# Patient Record
Sex: MALE | Race: BLACK OR AFRICAN AMERICAN | NOT HISPANIC OR LATINO | Employment: FULL TIME | URBAN - METROPOLITAN AREA
[De-identification: names, ages, dates, MRNs, and addresses within clinical notes are randomized per-mention and may not be internally consistent; named-entity substitution may affect disease eponyms.]

---

## 2017-01-24 RX ORDER — IBUPROFEN 800 MG/1
TABLET ORAL
Qty: 30 TAB | Refills: 0 | Status: SHIPPED | OUTPATIENT
Start: 2017-01-24 | End: 2022-01-11

## 2017-01-24 NOTE — TELEPHONE ENCOUNTER
Was the patient seen in the last year in this department? Yes 02/02/16 Jordan    Does patient have an active prescription for medications requested? No     Received Request Via: Pharmacy

## 2017-07-10 ENCOUNTER — TELEPHONE (OUTPATIENT)
Dept: MEDICAL GROUP | Facility: MEDICAL CENTER | Age: 47
End: 2017-07-10

## 2017-07-10 RX ORDER — IBUPROFEN 800 MG/1
TABLET ORAL
Qty: 60 TAB | OUTPATIENT
Start: 2017-07-10

## 2017-07-10 NOTE — TELEPHONE ENCOUNTER
PT called in about the refill for ibuprofen, he said he has switched providers. I let PT that he needs to contact new provider to refill.

## 2018-02-14 ENCOUNTER — HOSPITAL ENCOUNTER (EMERGENCY)
Facility: MEDICAL CENTER | Age: 48
End: 2018-02-14
Attending: EMERGENCY MEDICINE
Payer: MEDICARE

## 2018-02-14 VITALS
TEMPERATURE: 97 F | BODY MASS INDEX: 26.95 KG/M2 | WEIGHT: 188.27 LBS | SYSTOLIC BLOOD PRESSURE: 146 MMHG | HEART RATE: 103 BPM | DIASTOLIC BLOOD PRESSURE: 92 MMHG | RESPIRATION RATE: 16 BRPM | HEIGHT: 70 IN | OXYGEN SATURATION: 95 %

## 2018-02-14 PROCEDURE — 302449 STATCHG TRIAGE ONLY (STATISTIC)

## 2018-02-14 ASSESSMENT — PAIN SCALES - GENERAL: PAINLEVEL_OUTOF10: 7

## 2018-02-15 ENCOUNTER — HOSPITAL ENCOUNTER (EMERGENCY)
Facility: MEDICAL CENTER | Age: 48
End: 2018-02-15
Attending: EMERGENCY MEDICINE
Payer: MEDICARE

## 2018-02-15 VITALS
HEART RATE: 82 BPM | DIASTOLIC BLOOD PRESSURE: 67 MMHG | BODY MASS INDEX: 25.59 KG/M2 | TEMPERATURE: 97.9 F | SYSTOLIC BLOOD PRESSURE: 138 MMHG | RESPIRATION RATE: 20 BRPM | WEIGHT: 182.76 LBS | OXYGEN SATURATION: 97 % | HEIGHT: 71 IN

## 2018-02-15 DIAGNOSIS — K08.89 PAIN, DENTAL: ICD-10-CM

## 2018-02-15 PROCEDURE — 99283 EMERGENCY DEPT VISIT LOW MDM: CPT

## 2018-02-15 RX ORDER — OXYCODONE HYDROCHLORIDE 5 MG/1
5 TABLET ORAL EVERY 6 HOURS PRN
Qty: 8 TAB | Refills: 0 | Status: SHIPPED | OUTPATIENT
Start: 2018-02-15 | End: 2018-02-17

## 2018-02-15 RX ORDER — PENICILLIN V POTASSIUM 500 MG/1
500 TABLET ORAL 3 TIMES DAILY
Qty: 21 TAB | Refills: 0 | Status: SHIPPED | OUTPATIENT
Start: 2018-02-15 | End: 2018-02-22

## 2018-02-15 ASSESSMENT — LIFESTYLE VARIABLES: DO YOU DRINK ALCOHOL: NO

## 2018-02-16 NOTE — ED NOTES
PT assessment complete. Agree with triage note. Pt c/o right lower jaw pain for 1 week. Pt states that he had dental work and now has worsening pain. PT in gown. Educated on NPO status until cleared by MD. Pt is alert, active, age appropriate, and NAD. No needs. Will continue to monitor.

## 2018-02-16 NOTE — ED PROVIDER NOTES
ED Provider Note    Scribed for Dalton Rebollar M.D. by Moriah Metz. 2/15/2018  4:55 PM    Primary care provider: Pcp Pt States None  Means of arrival: Walk-in  History obtained from: Patient  History limited by: None    CHIEF COMPLAINT  Chief Complaint   Patient presents with   • Dental Pain     Right lower.  Pt reports he had dental work on that tooth 4 months ago.  No swelling noted.        HPI  Ricky Catalan is a 47 y.o. male who presents to the Emergency Department for evaluation of right lower dental pain, onset one week ago. Patient reports that the pain radiates to his jaw.       REVIEW OF SYSTEMS  Pertinent positives include dental pain, jaw pain.  See HPI for further details. E.     PAST MEDICAL HISTORY   has a past medical history of Brugada syndrome; FH: Brugada syndrome; and Psychiatric disorder.    SURGICAL HISTORY   has a past surgical history that includes bunionectomy (5/03/2016).    SOCIAL HISTORY  Social History   Substance Use Topics   • Smoking status: Former Smoker     Packs/day: 1.00     Years: 0.50     Types: Cigarettes     Quit date: 5/15/2006   • Smokeless tobacco: Not on file   • Alcohol use No      History   Drug Use No       FAMILY HISTORY  Family History   Problem Relation Age of Onset   • Hypertension Father    • Cancer Father    • Heart Disease Mother    • Psychiatry Mother        CURRENT MEDICATIONS  No current facility-administered medications for this encounter.     Current Outpatient Prescriptions:   •  ibuprofen (MOTRIN) 800 MG Tab, TAKE 1 TABLET EVERY 8 HOURS AS NEEDED  FOR  MILD  PAIN (SUBSTITUTED FOR MOTRIN), Disp: 30 Tab, Rfl: 0  •  pantoprazole (PROTONIX) 40 MG Tablet Delayed Response, Take 1 Tab by mouth every day., Disp: 30 Tab, Rfl: 11  •  etodolac (LODINE) 300 MG Cap, Take 1 Cap by mouth 2 times a day., Disp: 60 Cap, Rfl: 3  •  ketoconazole (NIZORAL) 2 % Cream, Apply 1 Application to affected area(s) 2 times a day. APPLY ONE APPLICATION TO AFFECTED AREA TWICE  "DAILY FOR 20 DAYS, Disp: 60 g, Rfl: 1  •  baclofen (LIORESAL) 10 MG Tab, Take 1 Tab by mouth 3 times a day., Disp: 60 Tab, Rfl: 6  •  venlafaxine XR (EFFEXOR XR) 75 MG CAPSULE SR 24 HR, Take 1 Cap by mouth every day., Disp: 30 Cap, Rfl: 3  •  sumatriptan (IMITREX) 50 MG Tab, Take 1 Tab by mouth Once PRN for Migraine for up to 1 dose., Disp: 10 Tab, Rfl: 3  •  zolpidem (AMBIEN) 5 MG Tab, Take 1 Tab by mouth at bedtime as needed for Sleep., Disp: 30 Tab, Rfl: 0  •  risperidone (RISPERDAL) 0.25 MG TABS, Take 0.25 mg by mouth 2 times a day., Disp: , Rfl:   •  quetiapine (SEROQUEL) 100 MG TABS, TAKE 1 TABLET BY MOUTH TWICE DAILY, Disp: 60 Tab, Rfl: 0    ALLERGIES  No Known Allergies    PHYSICAL EXAM  VITAL SIGNS: /107   Pulse 75   Temp 36.2 °C (97.1 °F)   Resp 16   Ht 1.791 m (5' 10.5\")   Wt 82.9 kg (182 lb 12.2 oz)   SpO2 98%   BMI 25.85 kg/m²     Constitutional: Well developed, Well nourished,no acute distress, Non-toxic appearance.   HENT: Tenderness to right side jaw. Normocephalic, Atraumatic. Oropharynx moist.   Eyes: PERRL, EOMI, Conjunctiva normal, No discharge.   Skin: Warm, Dry, No erythema, No rash.    Musculoskeletal: No major deformities noted.   Neurologic: Awake, alert. Moves all extremities spontaneously.  Psychiatric: Affect normal, Mood normal.       COURSE & MEDICAL DECISION MAKING  Nursing notes, VS, PMSFHx reviewed in chart.    4:55 PM - Patient seen and examined at bedside. Encouraged dental follow up. The patient will be discharged with Oxycodone and Veetid at this time and should return if symptoms worsen or if new symptoms arise. The patient understands and agrees to plan.     The patient is referred to a primary physician for blood pressure management, diabetic screening, and for all other preventative health concerns.    In prescribing controlled substances to this patient, I certify that I have obtained and reviewed the medical history of Ricky Catalan. I have also made a " good martita effort to obtain applicable records from other providers who have treated the patient and no recent narcotic prescriptions.     I have conducted a physical exam and documented it. I have reviewed Mr. Catalan’s prescription history as maintained by the [place] Prescription Monitoring Program.     I have assessed the patient’s risk for abuse, dependency, and addiction using the validated Opioid Risk Tool available at https://www.mdcalc.com/zfyyed-btqs-bhks-ort-narcotic-abuse.     Given the above, I believe the benefits of controlled substance therapy outweigh the risks. The reasons for prescribing controlled substances include in my professional opinion, controlled substances are the only reasonable choice for this patient because his dental pain cannot be fully resolved here.. Accordingly, I have discussed the risk and benefits, treatment plan, and alternative therapies with the patient. Patient does seem uncomfortable I'll prescribe only a small amount of narcotics until the patient can follow-up with dentist      DISPOSITION:  Patient will be discharged home in stable condition.    FOLLOW UP:  No follow-up provider specified.    OUTPATIENT MEDICATIONS:  New Prescriptions    OXYCODONE IMMEDIATE-RELEASE (ROXICODONE) 5 MG TAB    Take 1 Tab by mouth every 6 hours as needed for Severe Pain for up to 2 days.    PENICILLIN V POTASSIUM (VEETID) 500 MG TAB    Take 1 Tab by mouth 3 times a day for 7 days.       FINAL IMPRESSION  1. Pain, dental          I, Moriah Metz (Hedy), am scribing for, and in the presence of, Dalton Rebollar M.D..  r  Electronically signed by: Moriah Garza), 2/15/2018    IDalton M.D. personally performed the services described in this documentation, as scribed by Moriah Metz in my presence, and it is both accurate and complete.    The note accurately reflects work and decisions made by me.  Dalton Rebollar  2/15/2018  6:37 PM

## 2018-02-16 NOTE — ED TRIAGE NOTES
Chief Complaint   Patient presents with   • Dental Pain     Right lower.  Pt reports he had dental work on that tooth 4 months ago.  No swelling noted.    Pt to triage in NAD.  Pt educated on triage process and instructed to notify triage RN of any change in status.

## 2018-03-10 ENCOUNTER — HOSPITAL ENCOUNTER (EMERGENCY)
Facility: MEDICAL CENTER | Age: 48
End: 2018-03-10
Attending: EMERGENCY MEDICINE
Payer: MEDICARE

## 2018-03-10 VITALS
HEIGHT: 71 IN | OXYGEN SATURATION: 97 % | BODY MASS INDEX: 25.62 KG/M2 | DIASTOLIC BLOOD PRESSURE: 106 MMHG | WEIGHT: 183 LBS | HEART RATE: 78 BPM | RESPIRATION RATE: 18 BRPM | TEMPERATURE: 97.8 F | SYSTOLIC BLOOD PRESSURE: 149 MMHG

## 2018-03-10 DIAGNOSIS — K08.89 TOOTHACHE: ICD-10-CM

## 2018-03-10 PROCEDURE — 99284 EMERGENCY DEPT VISIT MOD MDM: CPT

## 2018-03-10 PROCEDURE — A9270 NON-COVERED ITEM OR SERVICE: HCPCS | Performed by: EMERGENCY MEDICINE

## 2018-03-10 PROCEDURE — 700102 HCHG RX REV CODE 250 W/ 637 OVERRIDE(OP): Performed by: EMERGENCY MEDICINE

## 2018-03-10 RX ORDER — AMOXICILLIN 500 MG/1
500 CAPSULE ORAL 3 TIMES DAILY
Qty: 21 CAP | Refills: 0 | Status: SHIPPED | OUTPATIENT
Start: 2018-03-10 | End: 2018-03-10

## 2018-03-10 RX ORDER — AMOXICILLIN 500 MG/1
500 CAPSULE ORAL 3 TIMES DAILY
Qty: 21 CAP | Refills: 0 | Status: SHIPPED | OUTPATIENT
Start: 2018-03-10 | End: 2018-03-17

## 2018-03-10 RX ORDER — AMOXICILLIN 500 MG/1
500 CAPSULE ORAL ONCE
Status: COMPLETED | OUTPATIENT
Start: 2018-03-10 | End: 2018-03-10

## 2018-03-10 RX ADMIN — AMOXICILLIN 500 MG: 500 CAPSULE ORAL at 17:43

## 2018-03-10 ASSESSMENT — PAIN SCALES - GENERAL: PAINLEVEL_OUTOF10: 7

## 2018-03-11 NOTE — ED NOTES
Reviewed d/c instructions with pt. Paper prescription provided per request. Has taken amoxicillin before without a reaction. Left in NAD with steady gait. A&OX4.

## 2018-03-11 NOTE — ED PROVIDER NOTES
ED Provider Note    Scribed for Lio Johnson M.D. by Namrata Rey. 3/10/2018  5:10 PM    Primary care provider: Pcp Pt States None  Means of arrival: Walk-in  History obtained from: Patient   History limited by: none    CHIEF COMPLAINT  Chief Complaint   Patient presents with   • Dental Pain       HPI  Ricky Catalan is a 47 y.o. male who presents to the Emergency Department for lower right dental pain with associated right jaw swelling. Patient denies any fever or loss of sensation to affected area associated. He has taken Ibuprofen with no relief of his pain. He has history of tooth infection. Patient denies allergies to medications.     REVIEW OF SYSTEMS  Pertinent positives include right lower dental pain, right jaw swelling. Pertinent negatives include no fever, loss of sensation to affected area.    E.    PAST MEDICAL HISTORY   has a past medical history of Brugada syndrome; FH: Brugada syndrome; and Psychiatric disorder.    SURGICAL HISTORY   has a past surgical history that includes bunionectomy (5/03/2016).    SOCIAL HISTORY  Social History   Substance Use Topics   • Smoking status: Former Smoker     Packs/day: 1.00     Years: 0.50     Types: Cigarettes     Quit date: 5/15/2006   • Alcohol use No      History   Drug Use No       FAMILY HISTORY  Family History   Problem Relation Age of Onset   • Hypertension Father    • Cancer Father    • Heart Disease Mother    • Psychiatry Mother        CURRENT MEDICATIONS  No current facility-administered medications on file prior to encounter.      Current Outpatient Prescriptions on File Prior to Encounter   Medication Sig Dispense Refill   • ibuprofen (MOTRIN) 800 MG Tab TAKE 1 TABLET EVERY 8 HOURS AS NEEDED  FOR  MILD  PAIN (SUBSTITUTED FOR MOTRIN) 30 Tab 0   • pantoprazole (PROTONIX) 40 MG Tablet Delayed Response Take 1 Tab by mouth every day. 30 Tab 11   • etodolac (LODINE) 300 MG Cap Take 1 Cap by mouth 2 times a day. 60 Cap 3   • ketoconazole  "(NIZORAL) 2 % Cream Apply 1 Application to affected area(s) 2 times a day. APPLY ONE APPLICATION TO AFFECTED AREA TWICE DAILY FOR 20 DAYS 60 g 1   • baclofen (LIORESAL) 10 MG Tab Take 1 Tab by mouth 3 times a day. 60 Tab 6   • venlafaxine XR (EFFEXOR XR) 75 MG CAPSULE SR 24 HR Take 1 Cap by mouth every day. 30 Cap 3   • sumatriptan (IMITREX) 50 MG Tab Take 1 Tab by mouth Once PRN for Migraine for up to 1 dose. 10 Tab 3   • zolpidem (AMBIEN) 5 MG Tab Take 1 Tab by mouth at bedtime as needed for Sleep. 30 Tab 0   • risperidone (RISPERDAL) 0.25 MG TABS Take 0.25 mg by mouth 2 times a day.     • quetiapine (SEROQUEL) 100 MG TABS TAKE 1 TABLET BY MOUTH TWICE DAILY 60 Tab 0         ALLERGIES  No Known Allergies    PHYSICAL EXAM  VITAL SIGNS: /89   Pulse 72   Temp 36.6 °C (97.8 °F) (Temporal)   Resp 14   Ht 1.803 m (5' 11\")   Wt 83 kg (183 lb)   SpO2 97%   BMI 25.52 kg/m²     Constitutional: Well developed, Well nourished, no distress, Non-toxic appearance.   HENT: Normocephalic, Atraumatic.  Oropharynx moist. Right lower posterior molar tenderness to palpation, no swelling, no abscess.   Eyes: PERRL, EOMI, Conjunctiva normal, No discharge.   Neck: no anterior cervical lymphadenopathy  CV: Good pulses  Thorax & Lungs: No respiratory distress.   Skin: Warm, Dry, No erythema, No rash.    Musculoskeletal: No major deformities noted.   Neurologic: Awake, alert. Moves all extremities spontaneously.  Psychiatric: Affect normal, Mood normal.     COURSE & MEDICAL DECISION MAKING  Pertinent Labs & Imaging studies reviewed. (See chart for details)        5:10 PM - Patient seen and examined at bedside. Discussed with patient that I will treat with 500mg Amoxicillin and that I cannot prescribe narcotics for his pain modulation. Advised patient treat with Ibuprofen and Tylenol. Patient was counseled to return to ED for any new or worsening symptoms. Patient understood and is in agreement for discharge.       Decision " Making:  Toothache we'll put the patient on amoxicillin, patient asked for narcotic pain medicines, this was denied.    The patient is referred to a primary physician for blood pressure management, diabetic screening, and for all other preventative health concerns.    DISPOSITION:  Patient will be discharged home in stable condition.    FOLLOW UP:  [facility], Emergency Dept  [address] [facility] [id number] [phone]    If symptoms worsen      OUTPATIENT MEDICATIONS:  Discharge Medication List as of 3/10/2018  5:38 PM      START taking these medications    Details   amoxicillin (AMOXIL) 500 MG Cap Take 1 Cap by mouth 3 times a day for 7 days., Disp-21 Cap, R-0, Normal               FINAL IMPRESSION  1. Toothache          INamrata (Scribe), am scribing for, and in the presence of, Lio Johnson M.D..    Electronically signed by: Namrata Rey (Scribe), 3/10/2018    ILio M.D. personally performed the services described in this documentation, as scribed by Namrata Rey in my presence, and it is both accurate and complete.    The note accurately reflects work and decisions made by me.  Lio Johnson  3/10/2018  7:17 PM

## 2018-03-11 NOTE — DISCHARGE INSTRUCTIONS
Please follow-up with your primary care provider for blood pressure management.    Toothache  Toothaches are usually caused by tooth decay (cavity). However, other causes of toothache include:  · Gum disease.  · Cracked tooth.  · Cracked filling.  · Injury.  · Jaw problem (temporo mandibular joint or TMJ disorder).  · Tooth abscess.  · Root sensitivity.  · Grinding.  · Eruption problems.  Swelling and redness around a painful tooth often means you have a dental abscess.  Pain medicine and antibiotics can help reduce symptoms, but you will need to see a dentist within the next few days to have your problem properly evaluated and treated. If tooth decay is the problem, you may need a filling or root canal to save your tooth. If the problem is more severe, your tooth may need to be pulled.  SEEK IMMEDIATE MEDICAL CARE IF:  · You cannot swallow.  · You develop severe swelling, increased redness, or increased pain in your mouth or face.  · You have a fever.  · You cannot open your mouth adequately.  Document Released: 01/25/2006 Document Revised: 03/11/2013 Document Reviewed: 03/17/2011  [employer]® Patient Information ©2014 [employer].

## 2018-12-11 ENCOUNTER — HOSPITAL ENCOUNTER (OUTPATIENT)
Dept: LAB | Facility: MEDICAL CENTER | Age: 48
End: 2018-12-11
Attending: NURSE PRACTITIONER
Payer: COMMERCIAL

## 2018-12-11 ENCOUNTER — OFFICE VISIT (OUTPATIENT)
Dept: BEHAVIORAL HEALTH | Facility: CLINIC | Age: 48
End: 2018-12-11
Payer: MEDICARE

## 2018-12-11 VITALS
SYSTOLIC BLOOD PRESSURE: 125 MMHG | HEART RATE: 80 BPM | DIASTOLIC BLOOD PRESSURE: 80 MMHG | HEIGHT: 70 IN | WEIGHT: 167 LBS | BODY MASS INDEX: 23.91 KG/M2

## 2018-12-11 DIAGNOSIS — F31.81 BIPOLAR 2 DISORDER (HCC): ICD-10-CM

## 2018-12-11 DIAGNOSIS — Z79.899 OTHER LONG TERM (CURRENT) DRUG THERAPY: ICD-10-CM

## 2018-12-11 DIAGNOSIS — F15.20 METHAMPHETAMINE DEPENDENCE, CONTINUOUS (HCC): ICD-10-CM

## 2018-12-11 DIAGNOSIS — Z95.0 PACEMAKER: ICD-10-CM

## 2018-12-11 DIAGNOSIS — F19.94 MOOD DISORDER, DRUG-INDUCED (HCC): ICD-10-CM

## 2018-12-11 PROBLEM — F32.9 MAJOR DEPRESSIVE DISORDER: Status: ACTIVE | Noted: 2018-12-11

## 2018-12-11 LAB
ALBUMIN SERPL BCP-MCNC: 4.6 G/DL (ref 3.2–4.9)
ALBUMIN/GLOB SERPL: 1.6 G/DL
ALP SERPL-CCNC: 63 U/L (ref 30–99)
ALT SERPL-CCNC: 15 U/L (ref 2–50)
ANION GAP SERPL CALC-SCNC: 8 MMOL/L (ref 0–11.9)
AST SERPL-CCNC: 20 U/L (ref 12–45)
BASOPHILS # BLD AUTO: 0.5 % (ref 0–1.8)
BASOPHILS # BLD: 0.03 K/UL (ref 0–0.12)
BILIRUB SERPL-MCNC: 0.7 MG/DL (ref 0.1–1.5)
BUN SERPL-MCNC: 12 MG/DL (ref 8–22)
CALCIUM SERPL-MCNC: 9.3 MG/DL (ref 8.5–10.5)
CHLORIDE SERPL-SCNC: 105 MMOL/L (ref 96–112)
CHOLEST SERPL-MCNC: 208 MG/DL (ref 100–199)
CO2 SERPL-SCNC: 27 MMOL/L (ref 20–33)
CREAT SERPL-MCNC: 0.93 MG/DL (ref 0.5–1.4)
EOSINOPHIL # BLD AUTO: 0.02 K/UL (ref 0–0.51)
EOSINOPHIL NFR BLD: 0.3 % (ref 0–6.9)
ERYTHROCYTE [DISTWIDTH] IN BLOOD BY AUTOMATED COUNT: 49.1 FL (ref 35.9–50)
FASTING STATUS PATIENT QL REPORTED: NORMAL
GLOBULIN SER CALC-MCNC: 2.8 G/DL (ref 1.9–3.5)
GLUCOSE SERPL-MCNC: 83 MG/DL (ref 65–99)
HCT VFR BLD AUTO: 46.1 % (ref 42–52)
HDLC SERPL-MCNC: 96 MG/DL
HGB BLD-MCNC: 15.2 G/DL (ref 14–18)
IMM GRANULOCYTES # BLD AUTO: 0.01 K/UL (ref 0–0.11)
IMM GRANULOCYTES NFR BLD AUTO: 0.2 % (ref 0–0.9)
LDLC SERPL CALC-MCNC: 100 MG/DL
LYMPHOCYTES # BLD AUTO: 2.02 K/UL (ref 1–4.8)
LYMPHOCYTES NFR BLD: 33.9 % (ref 22–41)
MCH RBC QN AUTO: 27.8 PG (ref 27–33)
MCHC RBC AUTO-ENTMCNC: 33 G/DL (ref 33.7–35.3)
MCV RBC AUTO: 84.3 FL (ref 81.4–97.8)
MONOCYTES # BLD AUTO: 0.35 K/UL (ref 0–0.85)
MONOCYTES NFR BLD AUTO: 5.9 % (ref 0–13.4)
NEUTROPHILS # BLD AUTO: 3.53 K/UL (ref 1.82–7.42)
NEUTROPHILS NFR BLD: 59.2 % (ref 44–72)
NRBC # BLD AUTO: 0 K/UL
NRBC BLD-RTO: 0 /100 WBC
PLATELET # BLD AUTO: 225 K/UL (ref 164–446)
PMV BLD AUTO: 11.4 FL (ref 9–12.9)
POTASSIUM SERPL-SCNC: 4 MMOL/L (ref 3.6–5.5)
PROT SERPL-MCNC: 7.4 G/DL (ref 6–8.2)
RBC # BLD AUTO: 5.47 M/UL (ref 4.7–6.1)
SODIUM SERPL-SCNC: 140 MMOL/L (ref 135–145)
T4 FREE SERPL-MCNC: 0.7 NG/DL (ref 0.53–1.43)
TRIGL SERPL-MCNC: 61 MG/DL (ref 0–149)
TSH SERPL DL<=0.005 MIU/L-ACNC: 0.14 UIU/ML (ref 0.38–5.33)
WBC # BLD AUTO: 6 K/UL (ref 4.8–10.8)

## 2018-12-11 PROCEDURE — 36415 COLL VENOUS BLD VENIPUNCTURE: CPT

## 2018-12-11 PROCEDURE — 84439 ASSAY OF FREE THYROXINE: CPT

## 2018-12-11 PROCEDURE — 83036 HEMOGLOBIN GLYCOSYLATED A1C: CPT | Mod: GA

## 2018-12-11 PROCEDURE — 80061 LIPID PANEL: CPT

## 2018-12-11 PROCEDURE — 99204 OFFICE O/P NEW MOD 45 MIN: CPT | Performed by: NURSE PRACTITIONER

## 2018-12-11 PROCEDURE — 85025 COMPLETE CBC W/AUTO DIFF WBC: CPT

## 2018-12-11 PROCEDURE — 84443 ASSAY THYROID STIM HORMONE: CPT

## 2018-12-11 PROCEDURE — 80053 COMPREHEN METABOLIC PANEL: CPT

## 2018-12-11 RX ORDER — RISPERIDONE 4 MG/1
4 TABLET ORAL
Qty: 30 TAB | Refills: 1 | Status: SHIPPED | OUTPATIENT
Start: 2018-12-11

## 2018-12-11 RX ORDER — QUETIAPINE FUMARATE 100 MG/1
100 TABLET, FILM COATED ORAL
Qty: 30 TAB | Refills: 1 | Status: SHIPPED | OUTPATIENT
Start: 2018-12-11

## 2018-12-11 RX ORDER — RISPERIDONE 2 MG/1
TABLET ORAL 2 TIMES DAILY
COMMUNITY
End: 2018-12-11

## 2018-12-11 RX ORDER — VENLAFAXINE HYDROCHLORIDE 150 MG/1
150 CAPSULE, EXTENDED RELEASE ORAL DAILY
Qty: 30 CAP | Refills: 1 | Status: SHIPPED | OUTPATIENT
Start: 2018-12-11 | End: 2022-01-11

## 2018-12-11 ASSESSMENT — PATIENT HEALTH QUESTIONNAIRE - PHQ9
CLINICAL INTERPRETATION OF PHQ2 SCORE: 2
SUM OF ALL RESPONSES TO PHQ QUESTIONS 1-9: 15
5. POOR APPETITE OR OVEREATING: 0 - NOT AT ALL
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
## 2018-12-13 LAB
EST. AVERAGE GLUCOSE BLD GHB EST-MCNC: 123 MG/DL
HBA1C MFR BLD: 5.9 % (ref 0–5.6)

## 2018-12-18 ENCOUNTER — TELEPHONE (OUTPATIENT)
Dept: BEHAVIORAL HEALTH | Facility: CLINIC | Age: 48
End: 2018-12-18

## 2018-12-18 NOTE — TELEPHONE ENCOUNTER
----- Message from Peggy Odell sent at 12/18/2018 10:25 AM PST -----  Regarding: lab results  Contact: [phone]  Has a question for you regarding his lab results.  Saw them on [email or web address]    Called patient on his number listed on the chart.  Unable to get a hold of him.  Left message explaining abnormal lab values.  Encouraged him to call with any further questions.

## 2018-12-20 ENCOUNTER — OFFICE VISIT (OUTPATIENT)
Dept: BEHAVIORAL HEALTH | Facility: CLINIC | Age: 48
End: 2018-12-20
Payer: MEDICARE

## 2018-12-20 DIAGNOSIS — F31.32 BIPOLAR AFFECTIVE DISORDER, CURRENTLY DEPRESSED, MODERATE (HCC): ICD-10-CM

## 2018-12-20 PROCEDURE — 90791 PSYCH DIAGNOSTIC EVALUATION: CPT | Performed by: PSYCHOLOGIST
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
## 2019-01-08 ENCOUNTER — APPOINTMENT (OUTPATIENT)
Dept: BEHAVIORAL HEALTH | Facility: CLINIC | Age: 49
End: 2019-01-08
Payer: MEDICARE

## 2019-04-14 RX ORDER — VENLAFAXINE HYDROCHLORIDE 150 MG/1
150 CAPSULE, EXTENDED RELEASE ORAL DAILY
Qty: 30 CAP | Refills: 1 | OUTPATIENT
Start: 2019-04-14

## 2019-07-22 ENCOUNTER — HOSPITAL ENCOUNTER (EMERGENCY)
Facility: MEDICAL CENTER | Age: 49
End: 2019-07-22
Attending: EMERGENCY MEDICINE
Payer: MEDICARE

## 2019-07-22 VITALS
HEART RATE: 82 BPM | BODY MASS INDEX: 24.49 KG/M2 | OXYGEN SATURATION: 98 % | DIASTOLIC BLOOD PRESSURE: 90 MMHG | SYSTOLIC BLOOD PRESSURE: 134 MMHG | RESPIRATION RATE: 16 BRPM | WEIGHT: 171.08 LBS | HEIGHT: 70 IN | TEMPERATURE: 97.7 F

## 2019-07-22 DIAGNOSIS — S39.012A STRAIN OF LUMBAR REGION, INITIAL ENCOUNTER: ICD-10-CM

## 2019-07-22 PROCEDURE — 99283 EMERGENCY DEPT VISIT LOW MDM: CPT

## 2019-07-22 RX ORDER — OXYCODONE HYDROCHLORIDE AND ACETAMINOPHEN 5; 325 MG/1; MG/1
1-2 TABLET ORAL EVERY 4 HOURS PRN
Qty: 15 TAB | Refills: 0 | Status: SHIPPED | OUTPATIENT
Start: 2019-07-22 | End: 2019-07-25

## 2019-07-22 RX ORDER — OMEPRAZOLE 20 MG/1
40 CAPSULE, DELAYED RELEASE ORAL
COMMUNITY

## 2019-07-22 NOTE — ED PROVIDER NOTES
ED Provider Note    CHIEF COMPLAINT  Chief Complaint   Patient presents with   • Back Pain     bending/lifting box this morning resulting in back pain/pressure to mid back, denies n/t.        HPI  Ricky Catalan is a 48 y.o. male who presents to the ED complaining of left lower back pain.  The patient was bending over lifting a box about an hour ago as he was lifting up felt a sharp pain in the left lower back.  Patient denies any radiation of the pain is just located primarily at the left lower back region.  Patient states is localized to that area denies any paresthesias denies any loss of bowel or bladder function denies any other symptoms.    REVIEW OF SYSTEMS  See HPI for further details. All other systems are negative.     PAST MEDICAL HISTORY  Past Medical History:   Diagnosis Date   • Bipolar disorder (HCC)    • Brugada syndrome    • Depression    • FH: Brugada syndrome    • History of psychiatric symptoms    • Psychiatric disorder     bipolar disorder   • PTSD (post-traumatic stress disorder)        FAMILY HISTORY  Family History   Problem Relation Age of Onset   • Hypertension Father    • Cancer Father    • Bipolar disorder Father    • Drug abuse Father    • Heart Disease Mother    • Psychiatry Mother    • Schizophrenia Mother    • Drug abuse Mother      Patient's family history has been discussed and is been found to be noncontributory to his present illness  SOCIAL HISTORY  Social History     Social History   • Marital status: Single     Spouse name: N/A   • Number of children: N/A   • Years of education: N/A     Social History Main Topics   • Smoking status: Former Smoker     Packs/day: 1.00     Years: 0.50     Types: Cigarettes     Quit date: 5/15/2006   • Smokeless tobacco: Never Used   • Alcohol use No   • Drug use: Yes     Types: Methamphetamines   • Sexual activity: No     Other Topics Concern   • Not on file     Social History Narrative   • No narrative on file      Randall Chi,  "M.D.      SURGICAL HISTORY  Past Surgical History:   Procedure Laterality Date   • BUNIONECTOMY  5/03/2016       CURRENT MEDICATIONS  Home Medications     Reviewed by Anna Billings R.N. (Registered Nurse) on 07/22/19 at 1609  Med List Status: Complete   Medication Last Dose Status   baclofen (LIORESAL) 10 MG Tab prn Active   ibuprofen (MOTRIN) 800 MG Tab prn Active   omeprazole (PRILOSEC) 20 MG delayed-release capsule 7/21/2019 Active   QUEtiapine (SEROQUEL) 100 MG Tab 7/21/2019 Active   risperidone (RISPERDAL) 4 MG tablet 7/21/2019 Active   venlafaxine (EFFEXOR-XR) 150 MG extended-release capsule 7/21/2019 Active                ALLERGIES  Allergies   Allergen Reactions   • Contrast Media With Iodine [Iodine] Vomiting       PHYSICAL EXAM  VITAL SIGNS: /90   Pulse 82   Temp 36.5 °C (97.7 °F) (Temporal)   Resp 16   Ht 1.778 m (5' 10\")   Wt 77.6 kg (171 lb 1.2 oz)   SpO2 98%   BMI 24.55 kg/m²    Pulse Ox interpretation nonhypoxic    Constitutional: Well developed, Well nourished, No acute distress, Non-toxic appearance.   Neck: Nontender midline good range of motion  Lymphatic: No lymphadenopathy noted.   Cardiovascular: Regular rate and rhythm without murmurs gallops or rubs.     Back: No significant tenderness midline he does have some tenderness left paraspinous muscular primarily at the lower and in the lumbosacral region.  Patient has good flexion he is able to touch his toes.  Good range of motion.  Musculoskeletal: Moving all 4 extremities fire 5 strength in all distributions   neurologic: Alert & oriented x 3, Normal motor function, Normal sensory function, No focal deficits noted.  No signs of cauda equina syndrome  Psychiatric: Affect normal, Judgment normal, Mood normal.         RADIOLOGY/PROCEDURES  No orders to display         COURSE & MEDICAL DECISION MAKING  Pertinent Labs & Imaging studies reviewed. (See chart for details)  Patient presents for evaluation.  This point I do feel that " it is a lumbosacral strain.  I recommended range of motion exercises I recommended anti-inflammatories OTC I will give her a prescription of narcotic pain medications as needed.  Recommend form to follow the primary care physician if he has any continued symptoms greater than 1 to 2 weeks for recheck and possible physical therapy referral.  Patient should return as needed.    FINAL IMPRESSION  1. Strain of lumbar region, initial encounter           The patient will return for new or worsening symptoms and is stable at the time of discharge.    The patient is referred to a primary physician for blood pressure management, diabetic screening, and for all other preventative health concerns.    I reviewed prescription monitoring program for patient's narcotic use before prescribing a scheduled drug.The patient will not drink alcohol nor drive with prescribed medications      In prescribing controlled substances to this patient, I certify that I have obtained and reviewed the medical history this patient I have also made a good martita effort to obtain applicable records from other providers who have treated the patient and records did not demonstrate any increased risk of substance abuse that would prevent me from prescribing controlled substances.     I have conducted a physical exam and documented it. I have reviewed Mr. Catalan’s prescription history as maintained by the [place] Prescription Monitoring Program.     I have assessed the patient’s risk for abuse, dependency, and addiction using the validated Opioid Risk Tool available at https://www.mdcalc.com/dldskn-hyzd-orti-ort-narcotic-abuse.     Given the above, I believe the benefits of controlled substance therapy outweigh the risks. The reasons for prescribing controlled substances include in my professional opinion, controlled substances are a reasonable choice for this patient. Accordingly, I have discussed the risk and benefits, treatment plan, and alternative  therapies with the patient. The patient has been consented for the medication and understands the risks.        DISPOSITION:  Patient will be discharged home in stable condition.    FOLLOW UP:  Randall Chi M.D.  [address] [facility] [id number] [phone]    Schedule an appointment as soon as possible for a visit in 1 week  For re-check, Return if any symptoms worsen      OUTPATIENT MEDICATIONS:  New Prescriptions    OXYCODONE-ACETAMINOPHEN (PERCOCET) 5-325 MG TAB    Take 1-2 Tabs by mouth every four hours as needed for up to 3 days.         Electronically signed by: Gurinder Santoyo, 7/22/2019 4:24 PM

## 2019-07-22 NOTE — ED TRIAGE NOTES
Chief Complaint   Patient presents with   • Back Pain     bending/lifting box this morning resulting in back pain/pressure to mid back, denies n/t.     Patient ambulatory to triage.      Explained wait time and triage process to pt. Pt placed back out in lobby, told to notify ED tech or triage RN of any changes, verbalized understanding.

## 2019-09-18 ENCOUNTER — OFFICE VISIT (OUTPATIENT)
Dept: OCCUPATIONAL MEDICINE | Facility: CLINIC | Age: 49
End: 2019-09-18

## 2019-09-18 ENCOUNTER — NON-PROVIDER VISIT (OUTPATIENT)
Dept: OCCUPATIONAL MEDICINE | Facility: CLINIC | Age: 49
End: 2019-09-18

## 2019-09-18 DIAGNOSIS — Z02.1 PRE-EMPLOYMENT DRUG SCREENING: ICD-10-CM

## 2019-09-18 DIAGNOSIS — Z02.1 PHYSICAL EXAM, PRE-EMPLOYMENT: ICD-10-CM

## 2019-09-18 LAB
AMP AMPHETAMINE: NORMAL
COC COCAINE: NORMAL
INT CON NEG: NORMAL
INT CON POS: NORMAL
MET METHAMPHETAMINES: NORMAL
OPI OPIATES: NORMAL
PCP PHENCYCLIDINE: NORMAL
POC DRUG COMMENT 753798-OCCUPATIONAL HEALTH: NORMAL
THC: NORMAL

## 2019-09-18 PROCEDURE — 80305 DRUG TEST PRSMV DIR OPT OBS: CPT | Performed by: PREVENTIVE MEDICINE

## 2019-09-18 PROCEDURE — 8915 PR COMPREHENSIVE PHYSICAL: Performed by: PREVENTIVE MEDICINE

## 2019-09-18 PROCEDURE — 86580 TB INTRADERMAL TEST: CPT | Performed by: PREVENTIVE MEDICINE

## 2019-10-01 ENCOUNTER — NON-PROVIDER VISIT (OUTPATIENT)
Dept: OCCUPATIONAL MEDICINE | Facility: CLINIC | Age: 49
End: 2019-10-01

## 2019-10-01 DIAGNOSIS — Z11.1 ENCOUNTER FOR PPD TEST: Primary | ICD-10-CM

## 2019-10-01 PROCEDURE — 86580 TB INTRADERMAL TEST: CPT | Performed by: NURSE PRACTITIONER

## 2019-10-01 NOTE — NON-PROVIDER
Patient came in for his second tb placement. He missed the first step to be read. Patient was explained that he needs to come in within 48 to 72 hours. 10/03 AFTER 09:13 am ([facility] open until 5 pm and [facility] open until 11 pm) or 10/04 BEFORE 09:13 am ([facility] opens at 07:30am). There was no tb instructions attached was told that I can give him a copy I just needed to get that printed for him patient declined and put this in his phone.

## 2019-10-03 ENCOUNTER — APPOINTMENT (OUTPATIENT)
Dept: URGENT CARE | Facility: CLINIC | Age: 49
End: 2019-10-03

## 2019-10-03 LAB — TB WHEAL 3D P 5 TU DIAM: NORMAL MM

## 2019-10-08 ENCOUNTER — NON-PROVIDER VISIT (OUTPATIENT)
Dept: OCCUPATIONAL MEDICINE | Facility: CLINIC | Age: 49
End: 2019-10-08

## 2019-10-08 ENCOUNTER — HOSPITAL ENCOUNTER (EMERGENCY)
Facility: MEDICAL CENTER | Age: 49
End: 2019-10-08
Attending: EMERGENCY MEDICINE
Payer: MEDICARE

## 2019-10-08 VITALS
HEART RATE: 79 BPM | DIASTOLIC BLOOD PRESSURE: 83 MMHG | WEIGHT: 174.6 LBS | TEMPERATURE: 97.4 F | HEIGHT: 71 IN | RESPIRATION RATE: 16 BRPM | OXYGEN SATURATION: 97 % | BODY MASS INDEX: 24.44 KG/M2 | SYSTOLIC BLOOD PRESSURE: 115 MMHG

## 2019-10-08 DIAGNOSIS — Z11.1 ENCOUNTER FOR PPD TEST: Primary | ICD-10-CM

## 2019-10-08 DIAGNOSIS — K08.89 PAIN, DENTAL: ICD-10-CM

## 2019-10-08 PROCEDURE — 700102 HCHG RX REV CODE 250 W/ 637 OVERRIDE(OP): Performed by: EMERGENCY MEDICINE

## 2019-10-08 PROCEDURE — A9270 NON-COVERED ITEM OR SERVICE: HCPCS | Performed by: EMERGENCY MEDICINE

## 2019-10-08 PROCEDURE — 99284 EMERGENCY DEPT VISIT MOD MDM: CPT

## 2019-10-08 PROCEDURE — 86580 TB INTRADERMAL TEST: CPT | Performed by: NURSE PRACTITIONER

## 2019-10-08 RX ORDER — PENICILLIN V POTASSIUM 500 MG/1
500 TABLET ORAL 3 TIMES DAILY
Qty: 21 TAB | Refills: 0 | Status: SHIPPED | OUTPATIENT
Start: 2019-10-08 | End: 2019-10-15

## 2019-10-08 RX ORDER — AMOXICILLIN 500 MG/1
500 CAPSULE ORAL ONCE
Status: COMPLETED | OUTPATIENT
Start: 2019-10-08 | End: 2019-10-08

## 2019-10-08 RX ORDER — HYDROCODONE BITARTRATE AND ACETAMINOPHEN 5; 325 MG/1; MG/1
1 TABLET ORAL ONCE
Status: COMPLETED | OUTPATIENT
Start: 2019-10-08 | End: 2019-10-08

## 2019-10-08 RX ORDER — HYDROCODONE BITARTRATE AND ACETAMINOPHEN 5; 325 MG/1; MG/1
1 TABLET ORAL EVERY 6 HOURS PRN
Qty: 12 TAB | Refills: 0 | Status: SHIPPED | OUTPATIENT
Start: 2019-10-08 | End: 2019-10-11

## 2019-10-08 RX ADMIN — HYDROCODONE BITARTRATE AND ACETAMINOPHEN 1 TABLET: 5; 325 TABLET ORAL at 17:31

## 2019-10-08 RX ADMIN — AMOXICILLIN 500 MG: 500 CAPSULE ORAL at 17:31

## 2019-10-08 ASSESSMENT — LIFESTYLE VARIABLES
TOTAL SCORE: 0
CONSUMPTION TOTAL: INCOMPLETE
DO YOU DRINK ALCOHOL: NO
TOTAL SCORE: 0
EVER FELT BAD OR GUILTY ABOUT YOUR DRINKING: NO
HAVE PEOPLE ANNOYED YOU BY CRITICIZING YOUR DRINKING: NO
TOTAL SCORE: 0
HAVE YOU EVER FELT YOU SHOULD CUT DOWN ON YOUR DRINKING: NO
EVER HAD A DRINK FIRST THING IN THE MORNING TO STEADY YOUR NERVES TO GET RID OF A HANGOVER: NO

## 2019-10-08 NOTE — ED NOTES
Patient ambulatory to [facility].  Agree with triage note.   Patient reports he has dentist and is just waiting to get in to seen him.

## 2019-10-08 NOTE — ED TRIAGE NOTES
Chief Complaint   Patient presents with   • Dental Pain     Triage process explained to patient.  Pt back to waiting room.  Pt instructed to inform RN if any changes or questions arise.

## 2019-10-09 NOTE — ED PROVIDER NOTES
ED Provider Note    HPI: Patient is a 48-year-old male who presented to the emergency department October 8, 2019 at 4:20 PM with a chief complaint of dental pain.    Patient has pain in his right first mandibular molar.  This tooth is cracked and he has an appointment to see the dentist in the next few days.  He has had no facial swelling no difficulty breathing or swallowing no vomiting.  No other somatic complaints    Review of Systems: Positive right mandibular pain negative facial swelling difficulty breathing swallowing vomiting.    Past medical/surgical history: Psychiatric issues/bipolar disorder PTSD Brugada syndrome bunion surgery    Medications: Effexor Risperdal Seroquel Motrin baclofen    Allergies: Iodine contrast    Social History: Patient has a previous history of smoking no alcohol use previous history of methamphetamine use patient denies current      Physical exam: Constitutional: Pleasant male awake alert  Vital signs: Temperature 96.8 pulse 81 respirations 16 blood pressure 127/88 pulse oximetry 97%  Musculoskeletal:  no  pain with palpitation or movement of muscle, bone or joint , no obvious musculoskeletal deformities identified.  Neurologic: alert and awake answers questions appropriately. Moves all four extremities independently, no gross focal abnormalities identified. Normal strength and motor.  Skin: no rash or lesion seen, no palpable dermatologic lesions identified.  EENT exam: Pharynx is clear uvula midline not swollen no retropharyngeal or parapharyngeal swelling seen.  Patient's right first mandibular molar is cracked.  There is no evidence of abscess.  No airway compromise.  No ear drainage seen.  Mastoids normal bilaterally    Medical decision making: Patient has no signs or symptoms of an oral abscess    Patient given Norco tablet in the department and will be discharged on Norco and Pen-Veamber K.  He was given his first dose of antibiotics here.  The patient states he has an  appointment with a dentist in the next few days.  He will keep this appointment.  The patient is given discharge instructions for dental pain.  The patient is carefully counseled return to ED immediately for any facial swelling difficulty breathing swallowing fever vomiting or any other problems    Patient verbalized understanding of these instructions and states he will comply    I reviewed prescription monitoring program for patient's narcotic use before prescribing a scheduled drug.The patient will not drink alcohol nor drive with prescribed medications. The patient will return for new or worsening symptoms and is stable at the time of discharge.        In prescribing controlled substances to this patient, I certify that I have obtained and reviewed the medical history of Ricky Catalan. I have also made a good martita effort to obtain applicable records from other providers who have treated the patient and records did not demonstrate any increased risk of substance abuse that would prevent me from prescribing controlled substances.      I have conducted a physical exam and documented it. I have reviewed Mr. Catalan's prescription history as maintained by the [place] Prescription Monitoring Program.      I have assessed the patient’s risk for abuse, dependency, and addiction using the validated Opioid Risk Tool available at https://www.mdcalc.com/gwkcba-giiw-afiv-ort-narcotic-abuse.      Given the above, I believe the benefits of controlled substance therapy outweigh the risks. The reasons for prescribing controlled substances include in my professional opinion, controlled substances are the only reasonable choice for this patientAccordingly, I have discussed the risk and benefits, treatment plan, and alternative therapies with the patient.     Impression dental pain

## 2019-10-09 NOTE — ED NOTES
Discharge instructions,m prescriptions x 2, and follow-up care reviewed with patient. All questions answered and patient verbalized understanding. Vss. Patient stable and ambulatory upon d/c from ED.

## 2019-10-10 ENCOUNTER — NON-PROVIDER VISIT (OUTPATIENT)
Dept: URGENT CARE | Facility: CLINIC | Age: 49
End: 2019-10-10

## 2019-10-10 LAB — TB WHEAL 3D P 5 TU DIAM: NORMAL MM

## 2020-02-13 ENCOUNTER — HOSPITAL ENCOUNTER (EMERGENCY)
Facility: MEDICAL CENTER | Age: 50
End: 2020-02-13
Attending: EMERGENCY MEDICINE
Payer: COMMERCIAL

## 2020-02-13 VITALS
OXYGEN SATURATION: 98 % | SYSTOLIC BLOOD PRESSURE: 147 MMHG | HEIGHT: 72 IN | RESPIRATION RATE: 18 BRPM | WEIGHT: 176.59 LBS | DIASTOLIC BLOOD PRESSURE: 115 MMHG | BODY MASS INDEX: 23.92 KG/M2 | TEMPERATURE: 98.2 F | HEART RATE: 70 BPM

## 2020-02-13 DIAGNOSIS — K04.7 DENTAL INFECTION: ICD-10-CM

## 2020-02-13 PROCEDURE — 99283 EMERGENCY DEPT VISIT LOW MDM: CPT

## 2020-02-13 RX ORDER — PENICILLIN V POTASSIUM 500 MG/1
500 TABLET ORAL 4 TIMES DAILY
Qty: 28 TAB | Refills: 1 | Status: SHIPPED | OUTPATIENT
Start: 2020-02-13 | End: 2020-02-20

## 2020-02-14 NOTE — DISCHARGE INSTRUCTIONS
You were seen in the Emergency Department for dental infection.    Please use 1,000mg of tylenol or 600mg of ibuprofen every 6 hours as needed for pain.  Take antibiotics as directed.    Please follow up with dentist as soon as possible.    Return to the Emergency Department with fevers, worsening pain, facial swelling, or other concerns.

## 2020-02-14 NOTE — ED PROVIDER NOTES
ED Provider Note    CHIEF COMPLAINT  Chief Complaint   Patient presents with   • Dental Pain     3/10 dental pain, possible infection started last night       HPI  Ricky Catalan is a 49 y.o. male with history of bipolar disorder and Brugada syndrome who presents with dental pain.  Patient endorses pain started last night in his right mandibular molar.  He has had issues with his tooth in the past and has been told he needs a root canal however he has been unable to afford seeing a dentist.  He states the pain is currently a 3 out of 10.  He has not had any associated fevers, vomiting, or other complaints.  He was seen for a similar infection of the tooth in October and treated with antibiotics which improved his symptoms until last night.    REVIEW OF SYSTEMS  See HPI for further details.   Positive for tooth pain  Negative for fevers, vomiting    PAST MEDICAL HISTORY   has a past medical history of Bipolar disorder (HCC), Brugada syndrome, Depression, FH: Brugada syndrome, History of psychiatric symptoms, Psychiatric disorder, and PTSD (post-traumatic stress disorder).    SOCIAL HISTORY  Social History     Tobacco Use   • Smoking status: Former Smoker     Packs/day: 1.00     Years: 0.50     Pack years: 0.50     Types: Cigarettes     Last attempt to quit: 5/15/2006     Years since quittin.7   • Smokeless tobacco: Never Used   Substance and Sexual Activity   • Alcohol use: No   • Drug use: Not Currently     Types: Methamphetamines   • Sexual activity: Never       SURGICAL HISTORY   has a past surgical history that includes bunionectomy (2016).    CURRENT MEDICATIONS  Home Medications    **Home medications have not yet been reviewed for this encounter**         ALLERGIES  Allergies   Allergen Reactions   • Contrast Media With Iodine [Iodine] Vomiting       PHYSICAL EXAM  VITAL SIGNS: /115   Pulse 70   Temp 36.8 °C (98.2 °F) (Temporal)   Resp 18   Ht 1.829 m (6')   Wt 80.1 kg (176 lb 9.4 oz)    SpO2 98%   BMI 23.95 kg/m²    Constitutional: Nontoxic appearing -American male.  Alert in no apparent distress.  HENT: Normocephalic, Atraumatic. Bilateral external ears normal. Nose normal. Moist mucous membranes.  No trismus or submandibular fullness.  Tenderness to the right mandibular molar which is cracked.  No obvious facial swelling or abscess.  Neck: Supple, full range of motion.  Eyes: Pupils are equal and reactive. Conjunctiva normal.   Skin: Warm, Dry. No rash.   Musculoskeletal: Atraumatic, no deformities noted.   Neurologic: Alert and oriented. Moving all extremities spontaneously  Psychiatric: Affect normal, Mood normal. Appears appropriate and not intoxicated.       DIAGNOSTIC STUDIES          ED COURSE  Vitals:    02/13/20 1755 02/13/20 1800   BP: 147/115    Pulse: 70    Resp: 18    Temp: 36.8 °C (98.2 °F)    TempSrc: Temporal    SpO2: 98%    Weight:  80.1 kg (176 lb 9.4 oz)   Height: 1.829 m (6') 1.829 m (6')         Medications administered:  Medications - No data to display        MEDICAL DECISION MAKING  Patient presents with 1 day history of right-sided mandibular dental pain.  He is well-appearing with normal vital signs on arrival.  Exam is not concerning for Jani's angina or underlying large abscess.  He does have evidence of minimal alveolar fullness which may be consistent with underlying infection.  He was treated for a similar infection in this tooth a few months prior and has been unable to follow-up with a dentist.  We will plan on discharging with another antibiotic prescription.  He was provided resources for dental follow-up.  Patient understands plan of care for discharge home as well as return precautions for changing or worsening symptoms.      IMPRESSION  (K04.7) Dental infection    Disposition: Discharge home, stable condition  Results, diagnoses, and treatment options were discussed with the patient and/or family. Patient verbalized understanding of plan of care  and strict return precautions prior to discharge.    Patient referred to primary care provider for monitoring and treatment of blood pressure.      Discharge Medication List as of 2/13/2020  6:49 PM      START taking these medications    Details   penicillin v potassium (VEETID) 500 MG Tab Take 1 Tab by mouth 4 times a day for 7 days., Disp-28 Tab, R-1, Print Rx Paper               Electronically signed by: Meredith Snow M.D., 2/13/2020 6:46 PM

## 2020-02-14 NOTE — ED NOTES
Patient reports ongoing issues with dental infection in Right lower jaw. Has previously been on ABX, but hasn't been able to see a dentist.

## 2020-02-14 NOTE — ED TRIAGE NOTES
Pt arrived via pov.     Chief Complaint   Patient presents with   • Dental Pain     3/10 dental pain, possible infection started last night     Pt oriented to ed process. Pt updated on wait times.

## 2020-06-27 ENCOUNTER — OFFICE VISIT (OUTPATIENT)
Dept: URGENT CARE | Facility: CLINIC | Age: 50
End: 2020-06-27
Payer: MEDICARE

## 2020-06-27 VITALS
BODY MASS INDEX: 26.67 KG/M2 | OXYGEN SATURATION: 95 % | TEMPERATURE: 99.6 F | SYSTOLIC BLOOD PRESSURE: 120 MMHG | WEIGHT: 176 LBS | DIASTOLIC BLOOD PRESSURE: 80 MMHG | RESPIRATION RATE: 16 BRPM | HEART RATE: 75 BPM | HEIGHT: 68 IN

## 2020-06-27 DIAGNOSIS — R21 RASH: Primary | ICD-10-CM

## 2020-06-27 PROCEDURE — 99213 OFFICE O/P EST LOW 20 MIN: CPT | Performed by: FAMILY MEDICINE

## 2020-06-27 RX ORDER — METHYLPREDNISOLONE 4 MG/1
TABLET ORAL
Qty: 21 TAB | Refills: 0 | Status: SHIPPED | OUTPATIENT
Start: 2020-06-27 | End: 2022-01-11

## 2020-06-27 RX ORDER — METHYLPREDNISOLONE 4 MG/1
TABLET ORAL
Qty: 21 TAB | Refills: 0 | Status: SHIPPED | OUTPATIENT
Start: 2020-06-27 | End: 2020-06-27

## 2020-06-27 RX ORDER — DOXYCYCLINE HYCLATE 100 MG
100 TABLET ORAL 2 TIMES DAILY
Qty: 10 TAB | Refills: 0 | Status: SHIPPED | OUTPATIENT
Start: 2020-06-27

## 2020-06-27 RX ORDER — DOXYCYCLINE HYCLATE 100 MG
100 TABLET ORAL 2 TIMES DAILY
Qty: 10 TAB | Refills: 0 | Status: SHIPPED | OUTPATIENT
Start: 2020-06-27 | End: 2020-06-27

## 2020-06-27 ASSESSMENT — ENCOUNTER SYMPTOMS
ANOREXIA: 0
FEVER: 0
FATIGUE: 0
NAIL CHANGES: 0
VOMITING: 0
RHINORRHEA: 0
DIARRHEA: 0
COUGH: 0
SORE THROAT: 0
SHORTNESS OF BREATH: 0
EYE PAIN: 0

## 2020-06-27 ASSESSMENT — FIBROSIS 4 INDEX: FIB4 SCORE: 1.12

## 2020-06-28 NOTE — PROGRESS NOTES
"Subjective:      Ricky Catalan is a 49 y.o. male who presents with Rash (x2 days, itching patches )            Rash   This is a new problem. The current episode started in the past 7 days. The problem has been gradually worsening since onset. Location: right upper shoulder, neck  The rash is characterized by itchiness (patchy, blanching ). Pertinent negatives include no anorexia, congestion, cough, diarrhea, eye pain, facial edema, fatigue, fever, joint pain, nail changes, rhinorrhea, shortness of breath, sore throat or vomiting. Past treatments include nothing. The treatment provided no relief. There is no history of allergies, asthma, eczema or varicella.       Review of Systems   Constitutional: Negative for fatigue and fever.   HENT: Negative for congestion, rhinorrhea and sore throat.    Eyes: Negative for pain.   Respiratory: Negative for cough and shortness of breath.    Gastrointestinal: Negative for anorexia, diarrhea and vomiting.   Musculoskeletal: Negative for joint pain.   Skin: Positive for rash. Negative for nail changes.   All other systems reviewed and are negative.         Objective:     /80 (Patient Position: Sitting)   Pulse 75   Temp 37.6 °C (99.6 °F) (Temporal)   Resp 16   Ht 1.727 m (5' 8\")   Wt 79.8 kg (176 lb)   SpO2 95%   BMI 26.76 kg/m²      Physical Exam  Vitals signs reviewed.   Constitutional:       General: He is not in acute distress.     Appearance: Normal appearance. He is normal weight. He is not ill-appearing, toxic-appearing or diaphoretic.   HENT:      Head: Normocephalic and atraumatic.      Right Ear: Tympanic membrane normal.      Left Ear: Tympanic membrane normal.      Nose: Nose normal.      Mouth/Throat:      Mouth: Mucous membranes are moist.      Pharynx: No oropharyngeal exudate or posterior oropharyngeal erythema.   Eyes:      Extraocular Movements: Extraocular movements intact.   Neck:      Musculoskeletal: Normal range of motion.   Cardiovascular: "      Rate and Rhythm: Normal rate.      Pulses: Normal pulses.   Pulmonary:      Effort: Pulmonary effort is normal.   Musculoskeletal: Normal range of motion.   Skin:     General: Skin is warm.      Capillary Refill: Capillary refill takes less than 2 seconds.   Neurological:      General: No focal deficit present.      Mental Status: He is alert.   Psychiatric:         Mood and Affect: Mood normal.                 Assessment/Plan:       1. Rash  1. Rash  methylPREDNISolone (MEDROL DOSEPAK) 4 MG Tablet Therapy Pack    doxycycline (VIBRAMYCIN) 100 MG Tab       - methylPREDNISolone (MEDROL DOSEPAK) 4 MG Tablet Therapy Pack; Follow schedule on package instructions.  Dispense: 21 Tab; Refill: 0  - doxycycline (VIBRAMYCIN) 100 MG Tab; Take 1 Tab by mouth 2 times a day.  Dispense: 10 Tab; Refill: 0    Patient vital signs within normal limits, we have the patient use the Medrol pack also because some of the rashes are growing into his upper neck area will have the patient use antibiotic  Explained to patient that at any time if the rash increase to come back to clinic  Patient also told me that he has been using a white amine powder for past few days and that is when he noticed the rash started so I explained to him to stop using the vitamin powder.   He denies any other exposure to any chemical, no so, no exposure to sun, no other new medications  All the red flag signs were reviewed with the patient, please come back to clinic if your sings symptoms worsen

## 2020-06-28 NOTE — PATIENT INSTRUCTIONS
Rash, Adult  A rash is a change in the color of your skin. A rash can also change the way your skin feels. There are many different conditions and factors that can cause a rash. Some rashes may disappear after a few days, but some may last for a few weeks. Common causes of rashes include:  · Viral infections, such as:  ? Colds.  ? Measles.  ? Hand, foot, and mouth disease.  · Bacterial infections, such as:  ? Scarlet fever.  ? Impetigo.  · Fungal infections, such as Candida.  · Allergic reactions to food, medicines, or skin care products.  Follow these instructions at home:  The goal of treatment is to stop the itching and keep the rash from spreading. Pay attention to any changes in your symptoms. Follow these instructions to help with your condition:  Medicine  Take or apply over-the-counter and prescription medicines only as told by your health care provider. These may include:  · Corticosteroid creams to treat red or swollen skin.  · Anti-itch lotions.  · Oral allergy medicines (antihistamines).  · Oral corticosteroids for severe symptoms.    Skin care  · Apply cool compresses to the affected areas.  · Do not scratch or rub your skin.  · Avoid covering the rash. Make sure the rash is exposed to air as much as possible.  Managing itching and discomfort  · Avoid hot showers or baths, which can make itching worse. A cold shower may help.  · Try taking a bath with:  ? Epsom salts. Follow  instructions on the packaging. You can get these at your local pharmacy or grocery store.  ? Baking soda. Pour a small amount into the bath as told by your health care provider.  ? Colloidal oatmeal. Follow  instructions on the packaging. You can get this at your local pharmacy or grocery store.  · Try applying baking soda paste to your skin. Stir water into baking soda until it reaches a paste-like consistency.  · Try applying calamine lotion. This is an over-the-counter lotion that helps to relieve  "itchiness.  · Keep cool and out of the sun. Sweating and being hot can make itching worse.  General instructions    · Rest as needed.  · Drink enough fluid to keep your urine pale yellow.  · Wear loose-fitting clothing.  · Avoid scented soaps, detergents, and perfumes. Use gentle soaps, detergents, perfumes, and other cosmetic products.  · Avoid any substance that causes your rash. Keep a journal to help track what causes your rash. Write down:  ? What you eat.  ? What cosmetic products you use.  ? What you drink.  ? What you wear. This includes jewelry.  · Keep all follow-up visits as told by your health care provider. This is important.  Contact a health care provider if:  · You sweat at night.  · You lose weight.  · You urinate more than normal.  · You urinate less than normal, or you notice that your urine is a darker color than usual.  · You feel weak.  · You vomit.  · Your skin or the whites of your eyes look yellow (jaundice).  · Your skin:  ? Tingles.  ? Is numb.  · Your rash:  ? Does not go away after several days.  ? Gets worse.  · You are:  ? Unusually thirsty.  ? More tired than normal.  · You have:  ? New symptoms.  ? Pain in your abdomen.  ? A fever.  ? Diarrhea.  Get help right away if you:  · Have a fever and your symptoms suddenly get worse.  · Develop confusion.  · Have a severe headache or a stiff neck.  · Have severe joint pains or stiffness.  · Have a seizure.  · Develop a rash that covers all or most of your body. The rash may or may not be painful.  · Develop blisters that:  ? Are on top of the rash.  ? Grow larger or grow together.  ? Are painful.  ? Are inside your nose or mouth.  · Develop a rash that:  ? Looks like purple pinprick-sized spots all over your body.  ? Has a \"bull's eye\" or looks like a target.  ? Is not related to sun exposure, is red and painful, and causes your skin to peel.  Summary  · A rash is a change in the color of your skin. Some rashes disappear after a few days, " but some may last for a few weeks.  · The goal of treatment is to stop the itching and keep the rash from spreading.  · Take or apply over-the-counter and prescription medicines only as told by your health care provider.  · Contact a health care provider if you have new or worsening symptoms.  · Keep all follow-up visits as told by your health care provider. This is important.  This information is not intended to replace advice given to you by your health care provider. Make sure you discuss any questions you have with your health care provider.  Document Released: 12/08/2003 Document Revised: 04/10/2020 Document Reviewed: 07/22/2019  Elsevier Patient Education © 2020 [employer]

## 2020-07-16 ENCOUNTER — HOSPITAL ENCOUNTER (OUTPATIENT)
Facility: MEDICAL CENTER | Age: 50
End: 2020-07-16
Attending: NURSE PRACTITIONER
Payer: COMMERCIAL

## 2020-07-16 ENCOUNTER — OFFICE VISIT (OUTPATIENT)
Dept: URGENT CARE | Facility: CLINIC | Age: 50
End: 2020-07-16
Payer: COMMERCIAL

## 2020-07-16 VITALS
DIASTOLIC BLOOD PRESSURE: 80 MMHG | WEIGHT: 170 LBS | HEIGHT: 71 IN | OXYGEN SATURATION: 96 % | TEMPERATURE: 98.5 F | RESPIRATION RATE: 16 BRPM | HEART RATE: 69 BPM | BODY MASS INDEX: 23.8 KG/M2 | SYSTOLIC BLOOD PRESSURE: 124 MMHG

## 2020-07-16 DIAGNOSIS — Z20.822 EXPOSURE TO COVID-19 VIRUS: ICD-10-CM

## 2020-07-16 DIAGNOSIS — R52 BODY ACHES: ICD-10-CM

## 2020-07-16 PROCEDURE — U0003 INFECTIOUS AGENT DETECTION BY NUCLEIC ACID (DNA OR RNA); SEVERE ACUTE RESPIRATORY SYNDROME CORONAVIRUS 2 (SARS-COV-2) (CORONAVIRUS DISEASE [COVID-19]), AMPLIFIED PROBE TECHNIQUE, MAKING USE OF HIGH THROUGHPUT TECHNOLOGIES AS DESCRIBED BY CMS-2020-01-R: HCPCS

## 2020-07-16 PROCEDURE — 99213 OFFICE O/P EST LOW 20 MIN: CPT | Mod: CS | Performed by: NURSE PRACTITIONER

## 2020-07-16 ASSESSMENT — ENCOUNTER SYMPTOMS
SORE THROAT: 0
CHILLS: 0
DIZZINESS: 0
EYE REDNESS: 0
BODY ACHES: 1
SHORTNESS OF BREATH: 0
VOMITING: 0
MYALGIAS: 0
NAUSEA: 0
ABDOMINAL PAIN: 1
FEVER: 0
HEADACHES: 1

## 2020-07-16 ASSESSMENT — FIBROSIS 4 INDEX: FIB4 SCORE: 1.12

## 2020-07-16 NOTE — LETTER
July 16, 2020         Patient: Ricky Catalan   YOB: 1970   Date of Visit: 7/16/2020           To Whom it May Concern:    Ricky Catalan was seen in my clinic on 7/16/2020. He may return to work on 7/19/2020, only if lab results have been confirmed.    If you have any questions or concerns, please don't hesitate to call.        Sincerely,           SAL Brown.  Electronically Signed

## 2020-07-17 DIAGNOSIS — Z20.822 EXPOSURE TO COVID-19 VIRUS: ICD-10-CM

## 2020-07-17 LAB
COVID ORDER STATUS COVID19: NORMAL
SARS-COV-2 RNA RESP QL NAA+PROBE: NOTDETECTED
SPECIMEN SOURCE: NORMAL

## 2020-07-17 NOTE — PROGRESS NOTES
"Subjective:   Ricky Catalan  is a 49 y.o. male who presents for Body Aches (and headaches, x 2 days , exposed to covid )        Generalized Body Aches   This is a new problem. Episode onset: 2 days; exposed to COVID at [employer]  The problem occurs constantly. The problem has been unchanged. Associated symptoms include abdominal pain and headaches. Pertinent negatives include no chest pain, chills, fever, myalgias, nausea, rash, sore throat or vomiting. Associated symptoms comments: Body aches  . Nothing aggravates the symptoms. He has tried nothing for the symptoms. The treatment provided no relief.     Review of Systems   Constitutional: Negative for chills and fever.        Body aches     HENT: Negative for sore throat.    Eyes: Negative for redness.   Respiratory: Negative for shortness of breath.    Cardiovascular: Negative for chest pain.   Gastrointestinal: Positive for abdominal pain. Negative for nausea and vomiting.   Genitourinary: Negative for dysuria.   Musculoskeletal: Negative for myalgias.   Skin: Negative for rash.   Neurological: Positive for headaches. Negative for dizziness.     Allergies   Allergen Reactions   • Contrast Media With Iodine [Iodine] Vomiting      Objective:   /80   Pulse 69   Temp 36.9 °C (98.5 °F) (Temporal)   Resp 16   Ht 1.791 m (5' 10.5\")   Wt 77.1 kg (170 lb)   SpO2 96%   BMI 24.05 kg/m²   Physical Exam  Vitals signs and nursing note reviewed.   Constitutional:       General: He is not in acute distress.     Appearance: He is well-developed.   HENT:      Head: Normocephalic and atraumatic.      Right Ear: Tympanic membrane and external ear normal.      Left Ear: Tympanic membrane and external ear normal.      Nose: Nose normal.      Right Sinus: No maxillary sinus tenderness or frontal sinus tenderness.      Left Sinus: No maxillary sinus tenderness or frontal sinus tenderness.      Mouth/Throat:      Mouth: Mucous membranes are moist.      Pharynx: Uvula " midline. No posterior oropharyngeal erythema.      Tonsils: No tonsillar exudate or tonsillar abscesses.   Eyes:      General:         Right eye: No discharge.         Left eye: No discharge.      Conjunctiva/sclera: Conjunctivae normal.   Cardiovascular:      Rate and Rhythm: Normal rate.   Pulmonary:      Effort: Pulmonary effort is normal. No respiratory distress.      Breath sounds: Normal breath sounds.   Abdominal:      General: There is no distension.   Musculoskeletal: Normal range of motion.   Skin:     General: Skin is warm and dry.   Neurological:      General: No focal deficit present.      Mental Status: He is alert and oriented to person, place, and time. Mental status is at baseline.      Gait: Gait (gait at baseline) normal.   Psychiatric:         Judgment: Judgment normal.           Assessment/Plan:   1. Exposure to COVID-19 virus  - COVID/SARS COV-2 PCR; Future    2. Body aches    · Patient will be tested for COVID-19 at this time  · Provided patient with self-isolation instructions  · Instructed to follow-up with health department  · Provided ER precautions including worsening shortness of breath and high fever  · Stressed the seriousness of isolation and prevention of transmissible disease  · Instructed to take 1000 mg of Tylenol 3 times per day      Differential diagnosis, natural history, supportive care, and indications for immediate follow-up discussed.

## 2022-01-11 ENCOUNTER — OFFICE VISIT (OUTPATIENT)
Dept: URGENT CARE | Facility: CLINIC | Age: 52
End: 2022-01-11
Payer: COMMERCIAL

## 2022-01-11 ENCOUNTER — HOSPITAL ENCOUNTER (OUTPATIENT)
Facility: MEDICAL CENTER | Age: 52
End: 2022-01-11
Attending: NURSE PRACTITIONER
Payer: COMMERCIAL

## 2022-01-11 VITALS
SYSTOLIC BLOOD PRESSURE: 100 MMHG | TEMPERATURE: 98 F | HEART RATE: 89 BPM | WEIGHT: 168.2 LBS | OXYGEN SATURATION: 96 % | HEIGHT: 71 IN | BODY MASS INDEX: 23.55 KG/M2 | RESPIRATION RATE: 14 BRPM | DIASTOLIC BLOOD PRESSURE: 60 MMHG

## 2022-01-11 DIAGNOSIS — J98.8 RTI (RESPIRATORY TRACT INFECTION): ICD-10-CM

## 2022-01-11 DIAGNOSIS — R05.9 COUGH: ICD-10-CM

## 2022-01-11 PROBLEM — R49.0 DYSPHONIA: Status: ACTIVE | Noted: 2019-08-06

## 2022-01-11 PROCEDURE — 99213 OFFICE O/P EST LOW 20 MIN: CPT | Performed by: NURSE PRACTITIONER

## 2022-01-11 PROCEDURE — U0003 INFECTIOUS AGENT DETECTION BY NUCLEIC ACID (DNA OR RNA); SEVERE ACUTE RESPIRATORY SYNDROME CORONAVIRUS 2 (SARS-COV-2) (CORONAVIRUS DISEASE [COVID-19]), AMPLIFIED PROBE TECHNIQUE, MAKING USE OF HIGH THROUGHPUT TECHNOLOGIES AS DESCRIBED BY CMS-2020-01-R: HCPCS

## 2022-01-11 PROCEDURE — U0005 INFEC AGEN DETEC AMPLI PROBE: HCPCS

## 2022-01-11 RX ORDER — LEVOTHYROXINE SODIUM 0.12 MG/1
TABLET ORAL
COMMUNITY
Start: 2021-11-12

## 2022-01-11 ASSESSMENT — ENCOUNTER SYMPTOMS
HEADACHES: 1
DIZZINESS: 0
MYALGIAS: 0
FEVER: 0
SORE THROAT: 0
VOMITING: 0
SHORTNESS OF BREATH: 0
EYE PAIN: 0
COUGH: 1
SINUS PAIN: 1
NAUSEA: 0
CHILLS: 1

## 2022-01-11 NOTE — PROGRESS NOTES
Subjective:   Ricky Catalan is a 51 y.o. male who presents for Cough (pain behind both eyes, fever, x 2 days )      URI   This is a new problem. Episode onset: 2 days; denies sick ccontacts not vaccinated for COVID. The problem has been unchanged. There has been no fever. Associated symptoms include congestion, coughing, headaches and sinus pain. Pertinent negatives include no chest pain, ear pain, nausea, rash, sore throat or vomiting. He has tried acetaminophen for the symptoms. The treatment provided no relief.       Review of Systems   Constitutional: Positive for chills. Negative for fever.   HENT: Positive for congestion and sinus pain. Negative for ear pain and sore throat.    Eyes: Negative for pain.   Respiratory: Positive for cough. Negative for shortness of breath.    Cardiovascular: Negative for chest pain.   Gastrointestinal: Negative for nausea and vomiting.   Genitourinary: Negative for hematuria.   Musculoskeletal: Negative for myalgias.   Skin: Negative for rash.   Neurological: Positive for headaches. Negative for dizziness.       Medications:    • doxycycline Tabs  • LEVOTHYROXINE-LIOTHYRONINE PO  • omeprazole  • QUEtiapine Tabs  • risperidone    Allergies: Contrast media with iodine [iodine]    Problem List: Ricky Catalan does not have any pertinent problems on file.    Surgical History:  Past Surgical History:   Procedure Laterality Date   • BUNIONECTOMY  5/03/2016       Past Social Hx: Ricky Catalan  reports that he quit smoking about 15 years ago. His smoking use included cigarettes. He has a 0.50 pack-year smoking history. He has never used smokeless tobacco. He reports previous drug use. Drug: Methamphetamines. He reports that he does not drink alcohol.     Past Family Hx:  Ricky Catalan family history includes Bipolar disorder in his father; Cancer in his father; Drug abuse in his father and mother; Heart Disease in his mother; Hypertension in his father; Psychiatric  "Illness in his mother; Schizophrenia in his mother.     Problem list, medications, and allergies reviewed by myself today in [employer].     Objective:     /60   Pulse 89   Temp 36.7 °C (98 °F)   Resp 14   Ht 1.791 m (5' 10.5\")   Wt 76.3 kg (168 lb 3.2 oz)   SpO2 96%   BMI 23.79 kg/m²     Physical Exam  Vitals and nursing note reviewed.   Constitutional:       General: He is not in acute distress.     Appearance: He is well-developed.   HENT:      Head: Normocephalic and atraumatic.      Right Ear: Tympanic membrane and external ear normal.      Left Ear: Tympanic membrane and external ear normal.      Nose: Nose normal.      Right Sinus: No maxillary sinus tenderness or frontal sinus tenderness.      Left Sinus: No maxillary sinus tenderness or frontal sinus tenderness.      Mouth/Throat:      Mouth: Mucous membranes are moist.      Pharynx: Uvula midline. No posterior oropharyngeal erythema.      Tonsils: No tonsillar exudate or tonsillar abscesses.   Eyes:      General:         Right eye: No discharge.         Left eye: No discharge.      Conjunctiva/sclera: Conjunctivae normal.   Cardiovascular:      Rate and Rhythm: Normal rate.   Pulmonary:      Effort: Pulmonary effort is normal. No respiratory distress.      Breath sounds: Normal breath sounds.   Abdominal:      General: There is no distension.   Musculoskeletal:         General: Normal range of motion.   Skin:     General: Skin is warm and dry.   Neurological:      General: No focal deficit present.      Mental Status: He is alert and oriented to person, place, and time. Mental status is at baseline.      Gait: Gait (gait at baseline) normal.   Psychiatric:         Judgment: Judgment normal.         Assessment/Plan:     Diagnosis and associated orders:     1. Cough  SARS-CoV-2 PCR (24 hour In-House): Collect NP swab in VTM   2. RTI (respiratory tract infection)        Comments/MDM:     The patient's presenting symptoms and exam findings most likely " are due to a viral etiology.     Test for COVID-19 via PCR. Result will be reviewed by myself. We will call/message back for results and appropriate further instructions. Instructed to sign up for [employer] if they have not already. Result will be automatically released to [employer] application for patient review. I will be sending a message with Next Step Instructions to [employer] soon after resulted.   Symptomatic and supportive care:   Plenty of oral hydration and rest   Over the counter cough suppressant as directed.  Tylenol or ibuprofen for pain and fever as directed.   Warm salt water gargles for sore throat, soft foods, cool liquids.   Saline nasal spray and Flonase as a decongestant.   Infection control measures at home. Stay away from people, Hand washing, covering sneeze/cough, disinfect surfaces.   Remain home from work, school, and other populated environments. Work note provided with information of quarantine measures per CDC guidelines.   Overall, the patient is well-appearing. They are not hypoxic, afebrile, and a normal pulmonary exam.      •              Please note that this dictation was created using voice recognition software. I have made a reasonable attempt to correct obvious errors, but I expect that there are errors of grammar and possibly content that I did not discover before finalizing the note.    This note was electronically signed by Mak STEVENS.

## 2022-01-12 DIAGNOSIS — R05.9 COUGH: ICD-10-CM

## 2022-01-12 LAB — COVID ORDER STATUS COVID19: NORMAL

## 2022-01-13 LAB
SARS-COV-2 RNA RESP QL NAA+PROBE: DETECTED
SPECIMEN SOURCE: ABNORMAL

## 2022-05-26 PROBLEM — S62.639A FRACTURE, FINGER, DISTAL PHALANX: Status: ACTIVE | Noted: 2022-05-26

## 2022-06-10 ENCOUNTER — APPOINTMENT (OUTPATIENT)
Dept: CARDIOLOGY | Facility: MEDICAL CENTER | Age: 52
End: 2022-06-10
Attending: ANESTHESIOLOGY
Payer: COMMERCIAL